# Patient Record
Sex: MALE | Race: OTHER | Employment: UNEMPLOYED | URBAN - METROPOLITAN AREA
[De-identification: names, ages, dates, MRNs, and addresses within clinical notes are randomized per-mention and may not be internally consistent; named-entity substitution may affect disease eponyms.]

---

## 2019-12-22 ENCOUNTER — APPOINTMENT (OUTPATIENT)
Dept: CT IMAGING | Facility: HOSPITAL | Age: 44
End: 2019-12-22

## 2019-12-22 ENCOUNTER — HOSPITAL ENCOUNTER (EMERGENCY)
Facility: HOSPITAL | Age: 44
Discharge: HOME | End: 2019-12-22

## 2019-12-22 VITALS
SYSTOLIC BLOOD PRESSURE: 132 MMHG | DIASTOLIC BLOOD PRESSURE: 96 MMHG | TEMPERATURE: 98 F | HEART RATE: 76 BPM | OXYGEN SATURATION: 99 % | RESPIRATION RATE: 20 BRPM

## 2019-12-22 DIAGNOSIS — R51.9 ACUTE NONINTRACTABLE HEADACHE, UNSPECIFIED HEADACHE TYPE: Primary | ICD-10-CM

## 2019-12-22 PROCEDURE — 99284 EMERGENCY DEPT VISIT MOD MDM: CPT

## 2019-12-22 PROCEDURE — 93010 ELECTROCARDIOGRAM REPORT: CPT | Performed by: INTERNAL MEDICINE

## 2019-12-22 PROCEDURE — 70450 CT HEAD/BRAIN W/O DYE: CPT

## 2019-12-22 PROCEDURE — 93005 ELECTROCARDIOGRAM TRACING: CPT

## 2019-12-22 RX ORDER — ONDANSETRON 4 MG/1
4 TABLET, ORALLY DISINTEGRATING ORAL ONCE
Status: COMPLETED | OUTPATIENT
Start: 2019-12-22 | End: 2019-12-22

## 2019-12-22 RX ORDER — IBUPROFEN 600 MG/1
600 TABLET ORAL ONCE
Status: COMPLETED | OUTPATIENT
Start: 2019-12-22 | End: 2019-12-22

## 2019-12-22 RX ORDER — ACETAMINOPHEN 500 MG
1000 TABLET ORAL ONCE
Status: COMPLETED | OUTPATIENT
Start: 2019-12-22 | End: 2019-12-22

## 2019-12-22 NOTE — ED INITIAL ASSESSMENT (HPI)
Pt here for etoh intoxication pt brought in a with a friend and states that he has \"passed out\" several times for the past 3-4 days. Pt denies drug use. Pt denies wanting detox. Pt reports a HOUGH.

## 2019-12-23 NOTE — ED NOTES
Patient provided with discharge instruction. Verbalized understanding for plan of care at home and follow up. All questions/concerns addressed prior to discharge, no apparent sign of distress, ambulate with family out of department with family.

## 2019-12-23 NOTE — ED PROVIDER NOTES
Patient Seen in: [facility] Emergency Department    History   Patient presents with:  Alcohol Intoxication  Syncope      HPI    Patient presents to the ED for evaluation. Chronic alcoholic, drank today.   Has fallen several times in the past 3 to Soft. He exhibits no distension. Musculoskeletal:         General: No tenderness, deformity or edema. Neurological: He is alert and oriented to person, place, and time. No tremor, stable gait   Skin: Skin is warm and dry.    Psychiatric: He has a norm evaluation. ED Course: Patient presents with a headache and recent history of several falls in the last 2 days due to alcohol abuse. CT obtained which shows no acute injury.   Patient denies any need for detox and no evidence for acute withdrawal in the

## 2019-12-25 PROBLEM — F10.232 ALCOHOL WITHDRAWAL SYNDROME WITH PERCEPTUAL DISTURBANCE (HCC): Status: ACTIVE | Noted: 2019-12-25

## 2019-12-25 PROBLEM — F10.239 ALCOHOL WITHDRAWAL (HCC): Status: ACTIVE | Noted: 2019-12-25
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
## 2019-12-25 NOTE — ED NOTES
Icelandic speaking patient, last drink 12/24/19, patient stand by assist, patient has tremor, will continue to monitor.

## 2019-12-25 NOTE — ED NOTES
Orders for admission, patient is aware of plan and ready to go upstairs. Any questions, please call ED RN Beto Lebron  at [address].

## 2019-12-25 NOTE — ED PROVIDER NOTES
Patient Seen in: [facility] AND [facility] Emergency Department      History   Patient presents with: Other    Stated Complaint: ?? HPI    Patient is a 79-year-old male who presents to the emergency department by ambulance now.   Apparently he was at the home Psychiatric:         Thought Content: Thought content is paranoid and delusional.         Cognition and Memory: Cognition is impaired.                ED Course     Labs Reviewed   BASIC METABOLIC PANEL (8) - Abnormal; Notable for the following components: ICD-10-CM Noted POA    Alcohol withdrawal syndrome with perceptual disturbance [facility]) B26.926 12/25/2019 Unknown
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
## 2019-12-25 NOTE — H&P
[facility]    PATIENT'S NAME: Quirino Romero   ATTENDING PHYSICIAN: Neda Daniel MD   PATIENT ACCOUNT#:   [id number]    LOCATION:  [place] [address]  MEDICAL RECORD #:   [id number]       YOB: 1975  ADMISSION DATE:       15 Trachea midline. No thyromegaly. LUNGS:  Clear to auscultation bilaterally. Normal respiratory effort. No intercostal retractions. HEART:  Regular rate, rhythm. S1 and S2 auscultated. No murmur. ABDOMEN:  Soft, nondistended. No tenderness.   Damian Roughen

## 2019-12-26 NOTE — PLAN OF CARE
Patient reports no suicidal ideation today. Tramadol given for pain control. CIWA protocol in place. Sitter at bedside for suicide precautions.      Problem: Patient Centered Care  Goal: Patient preferences are identified and integrated in the patient's suzy effects  - Notify MD/LIP if interventions unsuccessful or patient reports new pain  - Anticipate increased pain with activity and pre-medicate as appropriate  Outcome: Progressing     Problem: SAFETY ADULT - FALL  Goal: Free from fall injury  Description

## 2019-12-26 NOTE — PLAN OF CARE
Problem: Patient Centered Care  Goal: Patient preferences are identified and integrated in the patient's plan of care  Description  Interventions:  - What would you like us to know as we care for you?  Yi speaking  - Provide timely, complete, and acc assessment.  - Educate pt/family on patient safety including physical limitations  - Instruct pt to call for assistance with activity based on assessment  - Modify environment to reduce risk of injury  - Provide assistive devices as appropriate  - Consider

## 2019-12-26 NOTE — ED NOTES
Patient belonging is in [address]. Please call security once patient discharge to bring belonging.

## 2019-12-26 NOTE — BH PROGRESS NOTE
Consult dictated. Of course, we will refer him to EtOH abuse treatment, and mental health follow up, once he is detoxed. Manjit Khalil

## 2019-12-26 NOTE — BH PROGRESS NOTE
Behavioral Health Note:  CHIEF COMPLAINT:   ETOH withdrawal, SI with no intent     REASON FOR ADMISSION:   ETOH withdrawal, SI with no intent     SOCIAL HISTORY:  Jose Guadalupe Bess lives at home with his mom in [place], he has no worked in two weeks and was workin moderate-severe tremor to BL hands/legs/feet consistently throughout our session. Garo Deborahnickie reports that he only gets 1-3 hours of sleep per night because of his back and neck pain for the past year and will use ETOH as a sleep aid.     Garo Cabreranickie reports AH to sonya but denied it to Psych Liaison   Insight: somewhat limited   Judgment: impaired as evidenced by continued substance abuse     SUICIDAL RISK ASSESSMENT  Ashley Edmar reports SI with thoughts of throwing himself into a river but denies current/hx in

## 2019-12-27 ENCOUNTER — APPOINTMENT (OUTPATIENT)
Dept: MRI IMAGING | Facility: HOSPITAL | Age: 44
DRG: 897 | End: 2019-12-27
Attending: HOSPITALIST

## 2019-12-27 PROCEDURE — 72141 MRI NECK SPINE W/O DYE: CPT | Performed by: HOSPITALIST

## 2019-12-27 PROCEDURE — 70551 MRI BRAIN STEM W/O DYE: CPT | Performed by: HOSPITALIST

## 2019-12-27 NOTE — BH PROGRESS NOTE
Behavioral Health Note:  CHIEF COMPLAINT:   ETOH withdrawal, SI with no intent      REASON FOR ADMISSION:   ETOH withdrawal, SI with no intent      SOCIAL HISTORY:  Abundio Batista lives at home with his mom in [place], he has no worked in two weeks and was work he's feeling a lot better than he did yesterday. Psych Liaison and Celina Lovelace discussed the referrals that were discussed yesterday and he confirmed that he is interested in those services and receiving those referrals.      Psych Liaison reassured Joseph Alejandro

## 2019-12-27 NOTE — PROGRESS NOTES
[place] [facility]    Progress Note    Leskamaljit Trent Patient Status:  Inpatient    1975 MRN [id number]   Location [facility] 5SW/SE Attending Sandra Lane, [address] [facility] Day # 2 PCP No primary care provider on file.        Cheyenne Griggs 12/27/2019 at 11:00     Approved by (CST): Mark Martinez MD on 12/27/2019 at 11:05          Mri Spine Cervical (cpt=[id number])    Result Date: 12/27/2019  CONCLUSION:  Motion-degraded examination. Within these parameters: 1.  No acute fracture or osseous terrance

## 2019-12-27 NOTE — PLAN OF CARE
Problem: Patient Centered Care  Goal: Patient preferences are identified and integrated in the patient's plan of care  Description  Interventions:  - What would you like us to know as we care for you?  Kiswahili speaking  - Provide timely, complete, and acc appropriate  Outcome: Progressing     Problem: SAFETY ADULT - FALL  Goal: Free from fall injury  Description  INTERVENTIONS:  - Assess pt frequently for physical needs  - Identify cognitive and physical deficits and behaviors that affect risk of falls.   -
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
## 2019-12-27 NOTE — CM/SW NOTE
Pt discussed during dc rounds. Pt is currently on sitter precautions, requiring support from alcohol withdrawal. Psychiatry is following the case. CM team will remain available to assist with any medical needs indicated at dc.     TEQUILA VallesW, MSW,

## 2019-12-27 NOTE — PROGRESS NOTES
[place] [facility] - [facility]    Progress Note    Pavithra Rose Patient Status:  Inpatient    1975 MRN [id number]   Location [facility] 5SW/SE Attending Neris Sinclair, [address] [facility] Day # 1 PCP No primary care provider on file.        Dante Yo

## 2019-12-28 ENCOUNTER — APPOINTMENT (OUTPATIENT)
Dept: MRI IMAGING | Facility: HOSPITAL | Age: 44
DRG: 897 | End: 2019-12-28
Attending: HOSPITALIST

## 2019-12-28 PROCEDURE — 72146 MRI CHEST SPINE W/O DYE: CPT | Performed by: HOSPITALIST

## 2019-12-28 NOTE — PLAN OF CARE
Problem: Patient Centered Care  Goal: Patient preferences are identified and integrated in the patient's plan of care  Description  Interventions:  - What would you like us to know as we care for you?  Romansh speaking  - Provide timely, complete, and acc appropriate  Outcome: Progressing     Problem: SAFETY ADULT - FALL  Goal: Free from fall injury  Description  INTERVENTIONS:  - Assess pt frequently for physical needs  - Identify cognitive and physical deficits and behaviors that affect risk of falls.   -

## 2019-12-28 NOTE — BH PROGRESS NOTE
Reviewed chart, spoke with RN and sonya, and  Interviewed pt with Madai hassan, as . Pt reports he has been feeling better today. He said that his mood is good. He slept well, and said that his energy is good.   He reports concentrating

## 2019-12-28 NOTE — PLAN OF CARE
Problem: Patient Centered Care  Goal: Patient preferences are identified and integrated in the patient's plan of care  Description  Interventions:  - What would you like us to know as we care for you?  Kiswahili speaking  - Provide timely, complete, and acc appropriate  Outcome: Completed     Problem: SAFETY ADULT - FALL  Goal: Free from fall injury  Description  INTERVENTIONS:  - Assess pt frequently for physical needs  - Identify cognitive and physical deficits and behaviors that affect risk of falls.   - In

## 2019-12-28 NOTE — CONSULTS
Initial Consultation Note      HISTORY OF PRESENT ILLNESS:  Dio Prieto is a 38year old old male referred to the pain cservice by Stacie Quan for back/neck pain which began year. The pain radiates into the butt, neck, head pain.  No recent injury/ Drinks per session: 10 or more        Binge frequency: Daily or almost daily        Comment: 6-12 beers almost daily       Drug use: Never      Sexual activity: Not on file    Lifestyle      Physical activity:        Days per week: Not on file        M LEFT RIGHT   Radial 2/4 2/4   Dorsalis Pedis 2/4 2/4   Posterior Tibial 2/4 2/4   Brachial 2/4 2/4     SLR: negative  SIJ tenderness negative  Joint Exam: Normal  TPs:  Present within lumbo-sacral paraspinal muscles    Right Deep tendon reflexes: Normal SINUSES: Limited views demonstrate scattered mucosal thickening of the ethmoid air cells right maxillary sinus. Leftward nasal septal deviation and spur formation are noted. ORBITS: Limited views are grossly unremarkable.    OTHER: Impacted/unerupted 3rd m views demonstrate trace mucosal thickening of the maxillary sinuses. Leftward nasal septal deviation is apparent. ORBITS: Limited views are grossly unremarkable. OTHER: The flow voids of the major intracranial vessels are visualized.           CONCLUSION C2-C3: Minimal uncovertebral joint hypertrophy is noted without spinal canal or foraminal stenosis. C3-C4: A mild, diffuse disc bulge is present with bilateral uncovertebral joint hypertrophy.  These findings result in minimal ventral thecal sac effacement ([id number])  COMPARISON: [facility], MRI BRAIN ([id number]=[id number]), 12/27/2019, 9:43. [facility], MRI SPINE CERVICAL ([id number]=[id number]), 12/27/2019, 9:43. INDICATIONS: Thoracic back pain. Head trauma 4 months ago.   TECHNIQUE: A variety PROGRAM REVIEWED  No      ASSESSMENT:   38year old old male with pain all over his body--neck, head back, butt, and legs     PLAN:  RECOMMENDATIONS:  1) Medical Modalities: Continue pain meds  2) Interventional Modalities: Rejects any shots  3) Behavioral

## 2019-12-28 NOTE — PLAN OF CARE
Problem: Patient Centered Care  Goal: Patient preferences are identified and integrated in the patient's plan of care  Description  Interventions:  - What would you like us to know as we care for you?  Lao speaking  - Provide timely, complete, and acc appropriate  Outcome: Progressing     Problem: SAFETY ADULT - FALL  Goal: Free from fall injury  Description  INTERVENTIONS:  - Assess pt frequently for physical needs  - Identify cognitive and physical deficits and behaviors that affect risk of falls.   -

## 2019-12-29 NOTE — DISCHARGE SUMMARY
[place] [facility] - [facility]    Discharge Summary    Venus Masters Patient Status:  Inpatient    1975 MRN [id number]   Location [facility] 5SW/SE Attending No att. providers found   [facility] Day # 3 PCP No primary care provider on file. and sitter however psych eventually dc'd these.     Head, neck and thoracic back pain after a fall/trauma 4 months ago. - Improved  Pt notes decreased strength in hands. MRI brain ok. MRI C-spine shows area of spinal stenosis. MRI T-spine negative.   An

## (undated) NOTE — ED AVS SNAPSHOT
Venus Masters   MRN: [id number]    Department:  [facility] Emergency Department   Date of Visit:  12/22/2019           Disclosure     Insurance plans vary and the physician(s) referred by the ER may not be covered by your plan.  Please con CARE PHYSICIAN AT ONCE OR RETURN IMMEDIATELY TO THE EMERGENCY DEPARTMENT. If you have been prescribed any medication(s), please fill your prescription right away and begin taking the medication(s) as directed.   If you believe that any of the medications